# Patient Record
Sex: MALE | Race: ASIAN | Employment: FULL TIME | URBAN - METROPOLITAN AREA
[De-identification: names, ages, dates, MRNs, and addresses within clinical notes are randomized per-mention and may not be internally consistent; named-entity substitution may affect disease eponyms.]

---

## 2018-10-12 ENCOUNTER — NURSE ONLY (OUTPATIENT)
Dept: INTERNAL MEDICINE CLINIC | Facility: HOSPITAL | Age: 38
End: 2018-10-12
Attending: EMERGENCY MEDICINE

## 2018-10-12 DIAGNOSIS — Z00.00 WELLNESS EXAMINATION: Primary | ICD-10-CM

## 2018-10-12 PROCEDURE — 86787 VARICELLA-ZOSTER ANTIBODY: CPT

## 2018-10-12 PROCEDURE — 86762 RUBELLA ANTIBODY: CPT

## 2018-10-12 PROCEDURE — 86765 RUBEOLA ANTIBODY: CPT

## 2018-10-12 PROCEDURE — 86735 MUMPS ANTIBODY: CPT

## 2019-05-08 ENCOUNTER — OFFICE VISIT (OUTPATIENT)
Dept: INTERNAL MEDICINE CLINIC | Facility: HOSPITAL | Age: 39
End: 2019-05-08
Attending: EMERGENCY MEDICINE

## 2019-05-08 DIAGNOSIS — R76.11 POSITIVE TB TEST: Primary | ICD-10-CM

## 2019-05-08 PROCEDURE — 86480 TB TEST CELL IMMUN MEASURE: CPT

## 2020-03-25 ENCOUNTER — LAB ENCOUNTER (OUTPATIENT)
Dept: LAB | Facility: HOSPITAL | Age: 40
End: 2020-03-25
Attending: INTERNAL MEDICINE
Payer: COMMERCIAL

## 2020-03-25 DIAGNOSIS — B18.2 CHRONIC VIRAL HEPATITIS C (HCC): ICD-10-CM

## 2020-03-25 DIAGNOSIS — B18.2 CHRONIC HEPATITIS C WITH HEPATIC COMA (HCC): Primary | ICD-10-CM

## 2020-03-25 LAB
ALBUMIN SERPL-MCNC: 4.3 G/DL (ref 3.4–5)
ALBUMIN/GLOB SERPL: 1 {RATIO} (ref 1–2)
ALP LIVER SERPL-CCNC: 58 U/L (ref 45–117)
ALT SERPL-CCNC: 28 U/L (ref 16–61)
ANION GAP SERPL CALC-SCNC: 4 MMOL/L (ref 0–18)
AST SERPL-CCNC: 30 U/L (ref 15–37)
BASOPHILS # BLD AUTO: 0.06 X10(3) UL (ref 0–0.2)
BASOPHILS NFR BLD AUTO: 0.8 %
BILIRUB SERPL-MCNC: 0.3 MG/DL (ref 0.1–2)
BUN BLD-MCNC: 24 MG/DL (ref 7–18)
BUN/CREAT SERPL: 20.5 (ref 10–20)
CALCIUM BLD-MCNC: 9.3 MG/DL (ref 8.5–10.1)
CHLORIDE SERPL-SCNC: 103 MMOL/L (ref 98–112)
CO2 SERPL-SCNC: 30 MMOL/L (ref 21–32)
CREAT BLD-MCNC: 1.17 MG/DL (ref 0.7–1.3)
DEPRECATED RDW RBC AUTO: 39.5 FL (ref 35.1–46.3)
EOSINOPHIL # BLD AUTO: 0.41 X10(3) UL (ref 0–0.7)
EOSINOPHIL NFR BLD AUTO: 5.1 %
ERYTHROCYTE [DISTWIDTH] IN BLOOD BY AUTOMATED COUNT: 12.1 % (ref 11–15)
GLOBULIN PLAS-MCNC: 4.3 G/DL (ref 2.8–4.4)
GLUCOSE BLD-MCNC: 94 MG/DL (ref 70–99)
HCT VFR BLD AUTO: 39 % (ref 39–53)
HGB BLD-MCNC: 13.4 G/DL (ref 13–17.5)
IMM GRANULOCYTES # BLD AUTO: 0.02 X10(3) UL (ref 0–1)
IMM GRANULOCYTES NFR BLD: 0.3 %
INR BLD: 1.02 (ref 0.9–1.2)
LYMPHOCYTES # BLD AUTO: 3.22 X10(3) UL (ref 1–4)
LYMPHOCYTES NFR BLD AUTO: 40.3 %
M PROTEIN MFR SERPL ELPH: 8.6 G/DL (ref 6.4–8.2)
MCH RBC QN AUTO: 30.6 PG (ref 26–34)
MCHC RBC AUTO-ENTMCNC: 34.4 G/DL (ref 31–37)
MCV RBC AUTO: 89 FL (ref 80–100)
MONOCYTES # BLD AUTO: 0.63 X10(3) UL (ref 0.1–1)
MONOCYTES NFR BLD AUTO: 7.9 %
NEUTROPHILS # BLD AUTO: 3.66 X10 (3) UL (ref 1.5–7.7)
NEUTROPHILS # BLD AUTO: 3.66 X10(3) UL (ref 1.5–7.7)
NEUTROPHILS NFR BLD AUTO: 45.6 %
OSMOLALITY SERPL CALC.SUM OF ELEC: 288 MOSM/KG (ref 275–295)
PATIENT FASTING Y/N/NP: NO
PLATELET # BLD AUTO: 206 10(3)UL (ref 150–450)
POTASSIUM SERPL-SCNC: 4.1 MMOL/L (ref 3.5–5.1)
PROTHROMBIN TIME: 13.2 SECONDS (ref 11.8–14.5)
RBC # BLD AUTO: 4.38 X10(6)UL (ref 4.3–5.7)
SODIUM SERPL-SCNC: 137 MMOL/L (ref 136–145)
WBC # BLD AUTO: 8 X10(3) UL (ref 4–11)

## 2020-03-25 PROCEDURE — 36415 COLL VENOUS BLD VENIPUNCTURE: CPT

## 2020-03-25 PROCEDURE — 87522 HEPATITIS C REVRS TRNSCRPJ: CPT

## 2020-03-25 PROCEDURE — 85025 COMPLETE CBC W/AUTO DIFF WBC: CPT

## 2020-03-25 PROCEDURE — 85610 PROTHROMBIN TIME: CPT

## 2020-03-25 PROCEDURE — 80053 COMPREHEN METABOLIC PANEL: CPT

## 2020-05-09 ENCOUNTER — LAB ENCOUNTER (OUTPATIENT)
Dept: LAB | Facility: HOSPITAL | Age: 40
End: 2020-05-09
Attending: INTERNAL MEDICINE
Payer: COMMERCIAL

## 2020-05-09 DIAGNOSIS — B18.2 CHRONIC HEPATITIS C WITH HEPATIC COMA (HCC): Primary | ICD-10-CM

## 2020-05-09 PROCEDURE — 85025 COMPLETE CBC W/AUTO DIFF WBC: CPT

## 2020-05-09 PROCEDURE — 80053 COMPREHEN METABOLIC PANEL: CPT

## 2020-05-09 PROCEDURE — 36415 COLL VENOUS BLD VENIPUNCTURE: CPT

## 2020-05-09 PROCEDURE — 87522 HEPATITIS C REVRS TRNSCRPJ: CPT

## 2020-06-09 ENCOUNTER — LAB ENCOUNTER (OUTPATIENT)
Dept: LAB | Facility: HOSPITAL | Age: 40
End: 2020-06-09
Attending: INTERNAL MEDICINE
Payer: COMMERCIAL

## 2020-06-09 DIAGNOSIS — B18.2 CHRONIC HEPATITIS C WITH HEPATIC COMA (HCC): Primary | ICD-10-CM

## 2020-06-09 PROCEDURE — 85025 COMPLETE CBC W/AUTO DIFF WBC: CPT

## 2020-06-09 PROCEDURE — 87522 HEPATITIS C REVRS TRNSCRPJ: CPT

## 2020-06-09 PROCEDURE — 80053 COMPREHEN METABOLIC PANEL: CPT

## 2020-06-09 PROCEDURE — 36415 COLL VENOUS BLD VENIPUNCTURE: CPT

## 2020-06-25 DIAGNOSIS — Z78.9 PARTICIPANT IN HEALTH AND WELLNESS PLAN: Primary | ICD-10-CM

## 2020-06-30 ENCOUNTER — NURSE ONLY (OUTPATIENT)
Dept: LAB | Age: 40
End: 2020-06-30
Attending: PREVENTIVE MEDICINE

## 2020-06-30 DIAGNOSIS — Z78.9 PARTICIPANT IN HEALTH AND WELLNESS PLAN: ICD-10-CM

## 2020-06-30 PROCEDURE — 86769 SARS-COV-2 COVID-19 ANTIBODY: CPT

## 2020-07-01 LAB — SARS-COV-2 IGG SERPLBLD QL IA.RAPID: NEGATIVE

## 2020-12-18 ENCOUNTER — IMMUNIZATION (OUTPATIENT)
Dept: LAB | Facility: HOSPITAL | Age: 40
End: 2020-12-18
Attending: PREVENTIVE MEDICINE
Payer: COMMERCIAL

## 2020-12-18 DIAGNOSIS — Z23 NEED FOR VACCINATION: ICD-10-CM

## 2020-12-18 PROCEDURE — 0001A PFIZER-BIONTECH COVID-19 VACCINE: CPT | Performed by: NURSE PRACTITIONER

## 2021-01-08 ENCOUNTER — IMMUNIZATION (OUTPATIENT)
Dept: LAB | Facility: HOSPITAL | Age: 41
End: 2021-01-08
Attending: PREVENTIVE MEDICINE

## 2021-01-08 DIAGNOSIS — Z23 NEED FOR VACCINATION: ICD-10-CM

## 2021-01-08 PROCEDURE — 0002A SARSCOV2 VAC 30MCG/0.3ML IM: CPT

## 2022-01-07 ENCOUNTER — TELEPHONE (OUTPATIENT)
Dept: INTERNAL MEDICINE CLINIC | Facility: HOSPITAL | Age: 42
End: 2022-01-07

## 2022-01-07 NOTE — TELEPHONE ENCOUNTER
Outside Covid Testing done    Results and RTW guidelines:    COVID RESULT reported:      Test type:    [x] Rapid outside         [] PCR outside    Date of test: 1/1/22     Test location: outside          [] Result viewed in Epic with verbal consent receive diarrhea   - Keep communication open with management about RTW and if symptoms worsen     Notes:   Patient took outside testing, awaiting results from email address. Took antigen yesterday, negative. Sx resolved. Vaccinated and boosted.     Addendum 1/7/22 If non-employed enter email address:     HAVE YOU RECEIVED THE COVID-19 Vaccine?  Yes [x]    No []          If yes, date(s) received:      Vaccinated and boosted      Which vaccine:  Pfizer []     Vermillion []    J&J []          Date of symptom onset: 1/1 home?     [] Yes    [x] No   If yes, explain:       NOTES: (narrative documentation)

## 2022-03-22 ENCOUNTER — OFFICE VISIT (OUTPATIENT)
Dept: INTERNAL MEDICINE CLINIC | Facility: CLINIC | Age: 42
End: 2022-03-22
Payer: COMMERCIAL

## 2022-03-22 ENCOUNTER — TELEPHONE (OUTPATIENT)
Dept: INTERNAL MEDICINE CLINIC | Facility: CLINIC | Age: 42
End: 2022-03-22

## 2022-03-22 ENCOUNTER — HOSPITAL ENCOUNTER (OUTPATIENT)
Dept: GENERAL RADIOLOGY | Age: 42
Discharge: HOME | End: 2022-03-22
Attending: NURSE PRACTITIONER
Payer: COMMERCIAL

## 2022-03-22 VITALS
OXYGEN SATURATION: 88 % | WEIGHT: 161 LBS | BODY MASS INDEX: 22.54 KG/M2 | HEART RATE: 68 BPM | SYSTOLIC BLOOD PRESSURE: 122 MMHG | DIASTOLIC BLOOD PRESSURE: 80 MMHG | HEIGHT: 71 IN

## 2022-03-22 DIAGNOSIS — S93.401A MILD ANKLE SPRAIN, RIGHT, INITIAL ENCOUNTER: ICD-10-CM

## 2022-03-22 DIAGNOSIS — S93.401A MILD ANKLE SPRAIN, RIGHT, INITIAL ENCOUNTER: Primary | ICD-10-CM

## 2022-03-22 PROCEDURE — 3079F DIAST BP 80-89 MM HG: CPT | Performed by: NURSE PRACTITIONER

## 2022-03-22 PROCEDURE — 3074F SYST BP LT 130 MM HG: CPT | Performed by: NURSE PRACTITIONER

## 2022-03-22 PROCEDURE — 99203 OFFICE O/P NEW LOW 30 MIN: CPT | Performed by: NURSE PRACTITIONER

## 2022-03-22 PROCEDURE — 3008F BODY MASS INDEX DOCD: CPT | Performed by: NURSE PRACTITIONER

## 2022-03-22 PROCEDURE — 73610 X-RAY EXAM OF ANKLE: CPT | Performed by: NURSE PRACTITIONER

## 2022-03-22 NOTE — TELEPHONE ENCOUNTER
Patient came in to the office stating the form he received at his appointment for work has the wrong return date. Patient asked if he could get a new form with correct date. Please advice.

## 2022-03-23 NOTE — TELEPHONE ENCOUNTER
Spoke to pt, discussed during ov would RTW tomorrow he is req additional time off  to allow ankle to heal. He is off Thursday and would return Friday. RTW note changed and sent.

## 2025-06-04 ENCOUNTER — HOSPITAL ENCOUNTER (OUTPATIENT)
Age: 45
Discharge: HOME | End: 2025-06-04
Payer: COMMERCIAL

## 2025-06-04 VITALS
TEMPERATURE: 98 F | HEART RATE: 78 BPM | RESPIRATION RATE: 18 BRPM | DIASTOLIC BLOOD PRESSURE: 87 MMHG | OXYGEN SATURATION: 98 % | SYSTOLIC BLOOD PRESSURE: 126 MMHG
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
## 2025-06-04 DIAGNOSIS — S61.212A LACERATION OF RIGHT MIDDLE FINGER WITHOUT FOREIGN BODY WITHOUT DAMAGE TO NAIL, INITIAL ENCOUNTER: Primary | ICD-10-CM

## 2025-06-04 DIAGNOSIS — S61.259A DOG BITE OF FINGER, INITIAL ENCOUNTER: ICD-10-CM

## 2025-06-04 PROCEDURE — 90715 TDAP VACCINE 7 YRS/> IM: CPT | Performed by: EMERGENCY MEDICINE

## 2025-06-04 PROCEDURE — 90471 IMMUNIZATION ADMIN: CPT | Performed by: EMERGENCY MEDICINE

## 2025-06-04 PROCEDURE — 12001 RPR S/N/AX/GEN/TRNK 2.5CM/<: CPT | Performed by: EMERGENCY MEDICINE

## 2025-06-04 PROCEDURE — 99203 OFFICE O/P NEW LOW 30 MIN: CPT | Performed by: EMERGENCY MEDICINE

## 2025-06-04 RX ORDER — IBUPROFEN 600 MG/1
600 TABLET, FILM COATED ORAL EVERY 8 HOURS PRN
Qty: 30 TABLET | Refills: 0 | Status: SHIPPED | OUTPATIENT
Start: 2025-06-04 | End: 2025-06-11

## 2025-06-04 RX ORDER — CYCLOBENZAPRINE HCL 5 MG
5 TABLET ORAL 3 TIMES DAILY PRN
Qty: 30 TABLET | Refills: 0 | Status: SHIPPED | OUTPATIENT
Start: 2025-06-04

## 2025-06-04 RX ORDER — LIDOCAINE HYDROCHLORIDE 10 MG/ML
1 INJECTION, SOLUTION EPIDURAL; INFILTRATION; INTRACAUDAL; PERINEURAL ONCE
Status: COMPLETED | OUTPATIENT
Start: 2025-06-04 | End: 2025-06-04

## 2025-06-04 NOTE — DISCHARGE INSTRUCTIONS
1 stitch for dog bite to the hand.  Wound check in the next few days if needed, but this will come out within the next 7 to 10 days.  Call occupational health today, or tomorrow for follow-up appointment on Monday.   Augmentin sent to the pharmacy take 1 tablet twice a day for the next 7 days.   May return to work with light duty.  No pushing, pulling, crawling, climbing, grasping, or use of right hand until stitch comes out.  Ibuprofen, and Flexeril sent to the pharmacy.  Go to the ER for any new or worsening symptoms

## 2025-06-05 NOTE — ED PROVIDER NOTES
Patient Seen in: [facility]      History  Chief Complaint   Patient presents with    Bite     Stated Complaint: R Hand Laceration    Subjective:   HPI            Angel Sams is a 45 year old male here for dog bite by his own dog who is up-to-date on its vaccinations to right hand just prior to arrival.  Patient states he is updated on his tetanus shot.  No numbness or tingling to the area.  No pain out of proportion.  Bleeding controlled with pressure.  Otherwise no acute distress      Objective:     No pertinent past medical history.            No pertinent past surgical history.              No pertinent social history.            Review of Systems    Positive for stated complaint: R Hand Laceration  Other systems are as noted in HPI.  Constitutional and vital signs reviewed.      All other systems reviewed and negative except as noted above.                  Physical Exam    ED Triage Vitals [06/04/25 1326]   /87   Pulse 78   Resp 18   Temp 98.1 °F (36.7 °C)   Temp src Oral   SpO2 98 %   O2 Device None (Room air)       Current Vitals:   Vital Signs  BP: 126/87  Pulse: 78  Resp: 18  Temp: 98.1 °F (36.7 °C)  Temp src: Oral    Oxygen Therapy  SpO2: 98 %  O2 Device: None (Room air)            Physical Exam  Vitals and nursing note reviewed.   Constitutional:       Appearance: Normal appearance.   Musculoskeletal:         General: No deformity.      Comments: 1 cm jagged laceration to right distal aspect of middle digit to the pad of finger.  No nail involvement.  Able to approximate.  Good proximal pulses, and good active range of motion.  No signs of joint instability.  No evidence of foreign body.  Surrounding compartment soft, and NVI.    Skin:     Capillary Refill: Capillary refill takes less than 2 seconds.      Findings: No bruising, erythema or rash.   Neurological:      Mental Status: He is alert.                 ED Course  Labs Reviewed - No data to display  Laceration  Repair:  Indications, risk, and benefits, explained to patient and verbal informed consent obtained.    1cm jagged Laceration to distal aspect of third finger palmar/pad of finger aspect without joint involvement.  Anesthesia was performed with 1% lidocaine without epinephrine.  No foreign body appreciated.  No nail involvement.  The wound was irrigated with cool running water for 8 minutes.  cc of normal saline under pressure.    The patient was prepped and draped in the usual fashion.   1 suture placed using 4.0 Ethilon.  Loose Approximation. No active bleeding.    Xeroform and telfa applied. Reinforced with tube guaze.   Surrounding are cleaned. <1cc blood loss.   Pt tolerated procedure well, with no signs of neurovascular compromise. No pain.   Sutures out in 7-10 days.                         MDM            Medical Decision Making  Laceration vs skin avulsion vs abrasion vs Sprain vs other causes of sx. good active range of motion.  No obvious sign of fracture.  Tx for finger laceration related to dog bite.  Hand vs primary care follow-up as discussed.  Work note provided.  No e/o compartment syndrome, arterial injury, or nerve injury at this time.  NVI, and good pulses.   Education: proper ergonomics, safety, protection, and re-injury prevention.  Modified activity discussed.    I Updated patient  on all findings, who verbalized understanding and agreement with the plan. They are aware to go to the ER for new or worsening sx. PCP f/u as discussed.  All questions answered. No acute distress and cleared for home.     Problems Addressed:  Dog bite of finger, initial encounter: acute illness or injury  Laceration of right middle finger without foreign body without damage to nail, initial encounter: acute illness or injury    Amount and/or Complexity of Data Reviewed  External Data Reviewed: notes.    Risk  OTC drugs.  Prescription drug management.        Disposition and Plan     Clinical Impression:  1. Laceration  of right middle finger without foreign body without damage to nail, initial encounter    2. Dog bite of finger, initial encounter         Disposition:  Discharge  6/4/2025  2:29 pm    Follow-up:  Maicol Simons MD  [address] [facility] [address]  [phone]    Schedule an appointment as soon as possible for a visit       Robson Dennison MD  [address] [place] [id number]  [phone]                Medications Prescribed:  Discharge Medication List as of 6/4/2025  3:03 PM        START taking these medications    Details   amoxicillin clavulanate 875-125 MG Oral Tab Take 1 tablet by mouth 2 (two) times daily for 7 days., Normal, Disp-14 tablet, R-0      cyclobenzaprine 5 MG Oral Tab Take 1 tablet (5 mg total) by mouth 3 (three) times daily as needed for Muscle spasms., Normal, Disp-30 tablet, R-0      ibuprofen 600 MG Oral Tab Take 1 tablet (600 mg total) by mouth every 8 (eight) hours as needed for Pain or Fever., Normal, Disp-30 tablet, R-0                   Supplementary Documentation:

## (undated) NOTE — LETTER
3/22/2022    To Whom It May Concern:    Camden Fxo is currently under my medical care and may return to work at this time. Please excuse Sheba Anderson for 2 days. He may return to work on 3/23/2022 Wednesday. Activity is restricted as follows: none. If you require additional information please contact our office.         Sincerely,    SHERYL Womack            Document generated by:  SHERYL Womack

## (undated) NOTE — LETTER
Date & Time: 6/4/2025, 2:29 PM  Patient: Angel Sams  Encounter Provider(s):    Rosita Luis APRN       To Whom It May Concern:    Angel Sams was seen and treated in our department on 6/4/2025. He can return to work with restrictions 06/06/2025. No pushing, pulling, crawling, climbing, grasping, or use of right hand until stitch removed.  Documents, or other paperwork will be needed by active health, or primary care provider.    SHERYL Mooney, 06/04/25, 2:29 PM

## (undated) NOTE — LETTER
3/22/2022      To Whom It May Concern:    Socrates Chilel is currently under my medical care and may return to work at this time. Please excuse Chad Calles for 3 days. He may return to work on 3/25/2022 Friday  Activity is restricted as follows: none. If you require additional information please contact our office.         Sincerely,    SHERYL Victoria          Document generated by:  SHERYL Victoria

## (undated) NOTE — LETTER
Date & Time: 6/4/2025, 3:01 PM  Patient: Angel Sams  Encounter Provider(s):    Rosita Luis APRN       To Whom It May Concern:    Angel Sams was seen and treated in our department on 6/4/2025. He can return to work with these limitations 6/6/25: No use of right hand until stitch is removed.  This means no pushing, pulling, crawling, climbing, grasping, or lifting. Further work documents/paperwork will be needed by primary care provider, or specialist.      SHERYL Mooney, 06/04/25, 3:01 PM